# Patient Record
Sex: MALE | Race: WHITE | NOT HISPANIC OR LATINO
[De-identification: names, ages, dates, MRNs, and addresses within clinical notes are randomized per-mention and may not be internally consistent; named-entity substitution may affect disease eponyms.]

---

## 2017-11-27 PROCEDURE — 84403 ASSAY OF TOTAL TESTOSTERONE: CPT | Performed by: INTERNAL MEDICINE

## 2017-11-27 PROCEDURE — 84402 ASSAY OF FREE TESTOSTERONE: CPT | Performed by: INTERNAL MEDICINE

## 2017-11-27 PROCEDURE — 84146 ASSAY OF PROLACTIN: CPT | Performed by: INTERNAL MEDICINE

## 2017-11-27 PROCEDURE — 82533 TOTAL CORTISOL: CPT | Performed by: INTERNAL MEDICINE

## 2018-05-14 ENCOUNTER — CHARTING TRANS (OUTPATIENT)
Dept: OTHER | Age: 48
End: 2018-05-14

## 2019-01-04 ENCOUNTER — HOSPITAL ENCOUNTER (EMERGENCY)
Facility: HOSPITAL | Age: 49
Discharge: HOME | End: 2019-01-04
Attending: EMERGENCY MEDICINE
Payer: COMMERCIAL

## 2019-01-04 ENCOUNTER — APPOINTMENT (OUTPATIENT)
Dept: CT IMAGING | Facility: HOSPITAL | Age: 49
End: 2019-01-04
Attending: NURSE PRACTITIONER
Payer: COMMERCIAL

## 2019-01-04 VITALS
HEIGHT: 69 IN | SYSTOLIC BLOOD PRESSURE: 156 MMHG | BODY MASS INDEX: 31.7 KG/M2 | DIASTOLIC BLOOD PRESSURE: 77 MMHG | HEART RATE: 97 BPM | OXYGEN SATURATION: 99 % | TEMPERATURE: 99 F | WEIGHT: 214 LBS | RESPIRATION RATE: 18 BRPM

## 2019-01-04 DIAGNOSIS — N28.89 LEFT RENAL MASS: Primary | ICD-10-CM

## 2019-01-04 LAB
ALBUMIN SERPL-MCNC: 3.2 G/DL (ref 3.1–4.5)
ALBUMIN/GLOB SERPL: 0.8 {RATIO} (ref 1–2)
ALP LIVER SERPL-CCNC: 88 U/L (ref 45–117)
ALT SERPL-CCNC: 20 U/L (ref 17–63)
ANION GAP SERPL CALC-SCNC: 4 MMOL/L (ref 0–18)
AST SERPL-CCNC: 19 U/L (ref 15–41)
BASOPHILS # BLD AUTO: 0.04 X10(3) UL (ref 0–0.1)
BASOPHILS NFR BLD AUTO: 0.4 %
BILIRUB SERPL-MCNC: 0.2 MG/DL (ref 0.1–2)
BILIRUB UR QL STRIP.AUTO: NEGATIVE
BUN BLD-MCNC: 13 MG/DL (ref 8–20)
BUN/CREAT SERPL: 14.1 (ref 10–20)
CALCIUM BLD-MCNC: 9 MG/DL (ref 8.3–10.3)
CHLORIDE SERPL-SCNC: 105 MMOL/L (ref 101–111)
CLARITY UR REFRACT.AUTO: CLEAR
CO2 SERPL-SCNC: 29 MMOL/L (ref 22–32)
CREAT BLD-MCNC: 0.92 MG/DL (ref 0.7–1.3)
EOSINOPHIL # BLD AUTO: 0.1 X10(3) UL (ref 0–0.3)
EOSINOPHIL NFR BLD AUTO: 1 %
ERYTHROCYTE [DISTWIDTH] IN BLOOD BY AUTOMATED COUNT: 14 % (ref 11.5–16)
GLOBULIN PLAS-MCNC: 4 G/DL (ref 2.8–4.4)
GLUCOSE BLD-MCNC: 157 MG/DL (ref 70–99)
GLUCOSE UR STRIP.AUTO-MCNC: NEGATIVE MG/DL
HCT VFR BLD AUTO: 34.5 % (ref 37–53)
HGB BLD-MCNC: 11.7 G/DL (ref 13–17)
IMMATURE GRANULOCYTE COUNT: 0.03 X10(3) UL (ref 0–1)
IMMATURE GRANULOCYTE RATIO %: 0.3 %
KETONES UR STRIP.AUTO-MCNC: NEGATIVE MG/DL
LEUKOCYTE ESTERASE UR QL STRIP.AUTO: NEGATIVE
LYMPHOCYTES # BLD AUTO: 1.86 X10(3) UL (ref 0.9–4)
LYMPHOCYTES NFR BLD AUTO: 19 %
M PROTEIN MFR SERPL ELPH: 7.2 G/DL (ref 6.4–8.2)
MCH RBC QN AUTO: 28.8 PG (ref 27–33.2)
MCHC RBC AUTO-ENTMCNC: 33.9 G/DL (ref 31–37)
MCV RBC AUTO: 85 FL (ref 80–99)
MONOCYTES # BLD AUTO: 0.75 X10(3) UL (ref 0.1–1)
MONOCYTES NFR BLD AUTO: 7.7 %
NEUTROPHIL ABS PRELIM: 6.99 X10 (3) UL (ref 1.3–6.7)
NEUTROPHILS # BLD AUTO: 6.99 X10(3) UL (ref 1.3–6.7)
NEUTROPHILS NFR BLD AUTO: 71.6 %
NITRITE UR QL STRIP.AUTO: NEGATIVE
OSMOLALITY SERPL CALC.SUM OF ELEC: 289 MOSM/KG (ref 275–295)
PH UR STRIP.AUTO: 7 [PH] (ref 4.5–8)
PLATELET # BLD AUTO: 289 10(3)UL (ref 150–450)
POTASSIUM SERPL-SCNC: 3.8 MMOL/L (ref 3.6–5.1)
PROT UR STRIP.AUTO-MCNC: NEGATIVE MG/DL
RBC # BLD AUTO: 4.06 X10(6)UL (ref 4.3–5.7)
RED CELL DISTRIBUTION WIDTH-SD: 43 FL (ref 35.1–46.3)
SODIUM SERPL-SCNC: 138 MMOL/L (ref 136–144)
SP GR UR STRIP.AUTO: <1.005 (ref 1–1.03)
UROBILINOGEN UR STRIP.AUTO-MCNC: <2 MG/DL
WBC # BLD AUTO: 9.8 X10(3) UL (ref 4–13)

## 2019-01-04 PROCEDURE — 74176 CT ABD & PELVIS W/O CONTRAST: CPT | Performed by: NURSE PRACTITIONER

## 2019-01-04 PROCEDURE — 85025 COMPLETE CBC W/AUTO DIFF WBC: CPT | Performed by: NURSE PRACTITIONER

## 2019-01-04 PROCEDURE — 81001 URINALYSIS AUTO W/SCOPE: CPT | Performed by: EMERGENCY MEDICINE

## 2019-01-04 PROCEDURE — 71260 CT THORAX DX C+: CPT | Performed by: NURSE PRACTITIONER

## 2019-01-04 PROCEDURE — 99284 EMERGENCY DEPT VISIT MOD MDM: CPT | Performed by: EMERGENCY MEDICINE

## 2019-01-04 PROCEDURE — 81001 URINALYSIS AUTO W/SCOPE: CPT

## 2019-01-04 PROCEDURE — 36415 COLL VENOUS BLD VENIPUNCTURE: CPT | Performed by: EMERGENCY MEDICINE

## 2019-01-04 PROCEDURE — 99285 EMERGENCY DEPT VISIT HI MDM: CPT | Performed by: EMERGENCY MEDICINE

## 2019-01-04 PROCEDURE — 80053 COMPREHEN METABOLIC PANEL: CPT | Performed by: NURSE PRACTITIONER

## 2019-01-05 NOTE — ED PROVIDER NOTES
Patient Seen in: [facility] Emergency Department    History   Patient presents with:  Urinary Symptoms (urologic)    Stated Complaint: HEMATURIA    HPI  Patient is 56-year-old male presents with 2-day history of hematuria.   Patient experienced one e Eyes: Conjunctivae are normal.   Cardiovascular: Normal rate, regular rhythm and normal heart sounds. Pulmonary/Chest: Effort normal and breath sounds normal.   Abdominal: Soft. Bowel sounds are normal. He exhibits no distension and no mass.  There is n following components:    RBC 4.06 (*)     HGB 11.7 (*)     HCT 34.5 (*)     Neutrophil Absolute Prelim 6.99 (*)     Neutrophil Absolute 6.99 (*)     All other components within normal limits   CBC WITH DIFFERENTIAL WITH PLATELET    Narrative:      The follo atrophy, abnormal density, or significant focal lesion. BILIARY:  No visible dilatation or calcification. PANCREAS:  No lesion, fluid collection, ductal dilatation, or atrophy. SPLEEN:  Splenomegaly measuring 16 cm in AP dimension.  AORTA/VASCULAR:  No a fluids and to return to ED if he starts to pass blood clots. Patient signed out to Dr. Galindo Kerr at 2310-follow up on Chest CT result. Patient case discussed with Dr. Pama Castleman.     Disposition and Plan     Clinical Impression:  Left renal mass urology as an outpatient. Patient was made aware of findings. He is advised to return if symptoms worsen or new symptoms develop. DISPLAY PLAN FREE TEXT DISPLAY PLAN FREE TEXT DISPLAY PLAN FREE TEXT

## 2019-01-14 ENCOUNTER — APPOINTMENT (OUTPATIENT)
Dept: HEMATOLOGY/ONCOLOGY | Facility: HOSPITAL | Age: 49
End: 2019-01-14
Attending: INTERNAL MEDICINE
Payer: COMMERCIAL

## 2019-01-22 ENCOUNTER — LABORATORY ENCOUNTER (OUTPATIENT)
Dept: LAB | Facility: HOSPITAL | Age: 49
End: 2019-01-22
Payer: COMMERCIAL

## 2019-01-22 DIAGNOSIS — N28.89 RENAL MASS: ICD-10-CM

## 2019-01-22 LAB
ANTIBODY SCREEN: NEGATIVE
RH BLOOD TYPE: POSITIVE

## 2019-01-22 PROCEDURE — 86901 BLOOD TYPING SEROLOGIC RH(D): CPT

## 2019-01-22 PROCEDURE — 86850 RBC ANTIBODY SCREEN: CPT

## 2019-01-22 PROCEDURE — 86900 BLOOD TYPING SEROLOGIC ABO: CPT

## 2020-03-14 ENCOUNTER — APPOINTMENT (OUTPATIENT)
Dept: LAB | Age: 50
End: 2020-03-14

## 2024-06-18 ENCOUNTER — LAB ENCOUNTER (OUTPATIENT)
Dept: LAB | Age: 54
End: 2024-06-18
Attending: STUDENT IN AN ORGANIZED HEALTH CARE EDUCATION/TRAINING PROGRAM

## 2024-06-18 DIAGNOSIS — R19.7 DIARRHEA, UNSPECIFIED TYPE: ICD-10-CM

## 2024-06-18 DIAGNOSIS — T45.1X5A CHEMOTHERAPY-INDUCED DIARRHEA: ICD-10-CM

## 2024-06-18 DIAGNOSIS — K52.1 CHEMOTHERAPY-INDUCED DIARRHEA: ICD-10-CM

## 2024-06-18 PROBLEM — B02.9 HERPES ZOSTER WITHOUT COMPLICATION: Status: ACTIVE | Noted: 2020-05-28

## 2024-06-18 PROBLEM — R21 RASH AND NONSPECIFIC SKIN ERUPTION: Status: ACTIVE | Noted: 2021-05-20

## 2024-06-18 PROBLEM — C78.01 MALIGNANT NEOPLASM METASTATIC TO BOTH LUNGS (HCC): Status: ACTIVE | Noted: 2019-03-13

## 2024-06-18 PROBLEM — C64.2 PRIMARY MALIGNANT NEOPLASM OF LEFT KIDNEY WITH METASTASIS FROM KIDNEY TO OTHER SITE (HCC): Status: ACTIVE | Noted: 2019-03-13

## 2024-06-18 PROBLEM — M60.9 MYOSITIS: Status: ACTIVE | Noted: 2019-08-01

## 2024-06-18 PROBLEM — N18.2 CKD (CHRONIC KIDNEY DISEASE) STAGE 2, GFR 60-89 ML/MIN: Status: ACTIVE | Noted: 2019-11-25

## 2024-06-18 PROBLEM — E03.9 ACQUIRED HYPOTHYROIDISM: Status: ACTIVE | Noted: 2019-08-01

## 2024-06-18 PROBLEM — B02.29 POST HERPETIC NEURALGIA: Status: ACTIVE | Noted: 2020-05-28

## 2024-06-18 PROBLEM — C78.02 MALIGNANT NEOPLASM METASTATIC TO BOTH LUNGS (HCC): Status: ACTIVE | Noted: 2019-03-13

## 2024-06-18 PROBLEM — J30.2 SEASONAL ALLERGIES: Status: ACTIVE | Noted: 2019-08-01

## 2024-06-18 PROBLEM — R91.8 PULMONARY INFILTRATE ON CHEST X-RAY: Status: ACTIVE | Noted: 2022-04-25

## 2024-06-18 LAB — IGA SERPL-MCNC: 294 MG/DL (ref 70–312)

## 2024-06-18 PROCEDURE — 82784 ASSAY IGA/IGD/IGG/IGM EACH: CPT

## 2024-06-18 PROCEDURE — 86364 TISS TRNSGLTMNASE EA IG CLAS: CPT

## 2024-06-18 PROCEDURE — 36415 COLL VENOUS BLD VENIPUNCTURE: CPT

## 2024-06-19 ENCOUNTER — LAB ENCOUNTER (OUTPATIENT)
Dept: LAB | Age: 54
End: 2024-06-19
Attending: STUDENT IN AN ORGANIZED HEALTH CARE EDUCATION/TRAINING PROGRAM

## 2024-06-19 DIAGNOSIS — R19.7 DIARRHEA, UNSPECIFIED TYPE: ICD-10-CM

## 2024-06-19 DIAGNOSIS — K52.1 CHEMOTHERAPY-INDUCED DIARRHEA: ICD-10-CM

## 2024-06-19 DIAGNOSIS — T45.1X5A CHEMOTHERAPY-INDUCED DIARRHEA: ICD-10-CM

## 2024-06-19 LAB
CRYPTOSP AG STL QL IA: NEGATIVE
G LAMBLIA AG STL QL IA: NEGATIVE
TTG IGA SER-ACNC: 1 U/ML (ref ?–7)

## 2024-06-19 PROCEDURE — 83993 ASSAY FOR CALPROTECTIN FECAL: CPT

## 2024-06-19 PROCEDURE — 87209 SMEAR COMPLEX STAIN: CPT

## 2024-06-19 PROCEDURE — 87493 C DIFF AMPLIFIED PROBE: CPT

## 2024-06-19 PROCEDURE — 87015 SPECIMEN INFECT AGNT CONCNTJ: CPT

## 2024-06-19 PROCEDURE — 87427 SHIGA-LIKE TOXIN AG IA: CPT

## 2024-06-19 PROCEDURE — 87045 FECES CULTURE AEROBIC BACT: CPT

## 2024-06-19 PROCEDURE — 82656 EL-1 FECAL QUAL/SEMIQ: CPT

## 2024-06-19 PROCEDURE — 87329 GIARDIA AG IA: CPT

## 2024-06-19 PROCEDURE — 87272 CRYPTOSPORIDIUM AG IF: CPT

## 2024-06-19 PROCEDURE — 87046 STOOL CULTR AEROBIC BACT EA: CPT

## 2024-06-19 PROCEDURE — 87177 OVA AND PARASITES SMEARS: CPT

## 2024-06-20 LAB
C DIFF TOX B STL QL: NEGATIVE
CALPROTECTIN STL-MCNT: 209 ΜG/G (ref ?–50)

## 2024-06-20 NOTE — PROGRESS NOTES
Hi Alex,    Your recent stool studies show NO signs of Giardia (which is a parasite).     Please let me know if you have any questions or concerns.     Sincerely,  Renato Barajas MD

## 2024-06-20 NOTE — PROGRESS NOTES
Bijan Johnson,    Your recent blood work shows NO signs of celiac disease.     Please let me know if you have any questions or concerns.     Sincerely,  Renato Barajas MD

## 2024-06-21 NOTE — PROGRESS NOTES
Hi Alex,    Your recent stool studies show NO signs of a C. Difficile infection. Your recent stool study for fecal calprotectin, which can measure non-specific inflammation anywhere in the Gastrointestinal tract, is elevated. Please keep your schedule appointment for your colonoscopy for further evaluation of your symptoms and these findings.     Please let me know if you have any questions or concerns.     Sincerely,  Renato Barajas MD

## 2024-06-24 LAB — PANCREATIC ELAST FECAL: 500 UG ELAST./G

## (undated) NOTE — ED AVS SNAPSHOT
Rozina Mahoney   MRN: [id number]    Department:  [facility] Emergency Department   Date of Visit:  1/4/2019           Disclosure     Insurance plans vary and the physician(s) referred by the ER may not be covered by your plan.  Please contact your i tell this physician (or your personal doctor if your instructions are to return to your personal doctor) about any new or lasting problems. The primary care or specialist physician will see patients referred from the [facility] Emergency Department.  Evelyn Augustin